# Patient Record
Sex: FEMALE | Race: WHITE | URBAN - METROPOLITAN AREA
[De-identification: names, ages, dates, MRNs, and addresses within clinical notes are randomized per-mention and may not be internally consistent; named-entity substitution may affect disease eponyms.]

---

## 2018-06-08 ENCOUNTER — OFFICE VISIT (OUTPATIENT)
Dept: GERIATRIC MEDICINE | Age: 28
End: 2018-06-08

## 2018-06-08 VITALS
OXYGEN SATURATION: 99 % | SYSTOLIC BLOOD PRESSURE: 114 MMHG | TEMPERATURE: 97.7 F | BODY MASS INDEX: 21.85 KG/M2 | WEIGHT: 128 LBS | DIASTOLIC BLOOD PRESSURE: 66 MMHG | HEART RATE: 72 BPM | RESPIRATION RATE: 16 BRPM | HEIGHT: 64 IN

## 2018-06-08 DIAGNOSIS — B96.89 ACUTE BACTERIAL RHINOSINUSITIS: ICD-10-CM

## 2018-06-08 DIAGNOSIS — R68.89 FULLNESS IN HEAD: Primary | ICD-10-CM

## 2018-06-08 DIAGNOSIS — H66.011 ACUTE SUPPURATIVE OTITIS MEDIA OF RIGHT EAR WITH SPONTANEOUS RUPTURE OF TYMPANIC MEMBRANE, RECURRENCE NOT SPECIFIED: ICD-10-CM

## 2018-06-08 DIAGNOSIS — J01.90 ACUTE BACTERIAL RHINOSINUSITIS: ICD-10-CM

## 2018-06-08 DIAGNOSIS — L20.84 INTRINSIC ECZEMA: ICD-10-CM

## 2018-06-08 PROBLEM — L40.54: Chronic | Status: ACTIVE | Noted: 2018-06-08

## 2018-06-08 RX ORDER — FLUTICASONE PROPIONATE 50 MCG
2 SPRAY, SUSPENSION (ML) NASAL DAILY
Qty: 1 BOTTLE | Refills: 3 | Status: SHIPPED | OUTPATIENT
Start: 2018-06-08 | End: 2018-06-13

## 2018-06-08 RX ORDER — AMOXICILLIN AND CLAVULANATE POTASSIUM 875; 125 MG/1; MG/1
1 TABLET, FILM COATED ORAL 2 TIMES DAILY
Qty: 14 TAB | Refills: 0 | Status: SHIPPED | OUTPATIENT
Start: 2018-06-08 | End: 2018-06-15

## 2018-06-08 RX ORDER — FLUCONAZOLE 150 MG/1
150 TABLET ORAL ONCE
Qty: 1 TAB | Refills: 0 | Status: SHIPPED | OUTPATIENT
Start: 2018-06-08 | End: 2018-06-08

## 2018-06-08 NOTE — PROGRESS NOTES
ADVISED PATIENT OF THE FOLLOWING HEALTH MAINTAINCE DUE  Health Maintenance Due   Topic Date Due    HPV Age 9Y-34Y (1 of 1 - Female 3 Dose Series) 01/28/2003    DTaP/Tdap/Td series (1 - Tdap) 01/28/2013    PAP AKA CERVICAL CYTOLOGY  01/28/2013      Chief Complaint   Patient presents with    Cold Symptoms     Patient being seen for cold s/s, c/o popping in her ears. 1. Have you been to the ER, urgent care clinic since your last visit? Hospitalized since your last visit? Yes Patient sees outsided provider    2. Have you seen or consulted any other health care providers outside of the [address] [place] [address] since your last visit? Include any DEXA scan, mammography  or colon screening. No    3. Do you have an Advance Directive on file? no    4. Do you have a DNR on file? Full        Patient is accompanied by self I have received verbal consent from [place] to discuss any/all medical information while they are present in the room.

## 2018-06-08 NOTE — LETTER
2018 2:58 PM 
 
Ms. Reina Jane [address] [facility] [address] [facility] [address] [facility] [address] Discharge Instructions/Plan of Care 18 - Please remember to not change the treatment plan without letting us know as the orders are specific to your care needs. - If you do not receive your medications it is your responsibility to let us know or contact your pharmacy. Any questions do not hesitate to call Ar Ru[facility] Nurse at [phone]. Discontinued Medication/Treatment: The following items have been discontinued today, please stop all use of these items below: There are no discontinued medications. Treatment Plan: 1. Fullness in head 2. Acute bacterial rhinosinusitis 3. Acute suppurative otitis media of right ear with spontaneous rupture of tympanic membrane, recurrence not specified 4. Intrinsic eczema Orders Placed This Encounter  amoxicillin-clavulanate (AUGMENTIN) 875-125 mg per tablet Sig: Take 1 Tab by mouth two (2) times a day for 7 days. Indications: ACUTE BACTERIAL SINUSITIS Dispense:  14 Tab Refill:  0  
 fluconazole (DIFLUCAN) 150 mg tablet Sig: Take 1 Tab by mouth once for 1 dose. Indications: PREVENTION OF VULVOVAGINAL CANDIDIASIS Dispense:  1 Tab Refill:  0  
 fluticasone (FLONASE) 50 mcg/actuation nasal spray (sent over but you can get them over the counter) Si Sprays by Both Nostrils route daily for 5 days. Indications: Allergic Rhinitis Dispense:  1 Bottle Refill:  3 Return Visit/Follow Up:   
 
Return as needed. We appreciate you allowing us to participate in your health care.  
 
Ying Gutierrez NP

## 2018-06-11 RX ORDER — PILOCARPINE HYDROCHLORIDE 5 MG/1
TABLET, FILM COATED ORAL
COMMUNITY
Start: 2018-06-01

## 2018-06-11 NOTE — PROGRESS NOTES
Reason for Visit:      Chief Complaint   Patient presents with    Cold Symptoms     Patient being seen for cold s/s, c/o popping in her ears. History of Present Illness:   Nicole Blank is a 34 y.o. female adult patient who presents today with concerns of a \"head cold\" x 1 week. She states she has had a snotty nose, runny eyes, cough, and congestion over a week. Ms. Kim Cohen states she is more of a holistic treatment patient and would rather to treat without medications ect. . She has been taking Zyrtec x 1 week without any help. During our visit it is noted that she is under treatment of a provider for psoriatic arthritis. Today she has noted a lot of \"popping\" in both ears as well as more fullness in her head. During her exam I found that the right ear TM has ruptured and is red and inflamed. The left is bulging without infection. She also has a sinus infection and notes that she removed a large piece of wax from her right ear last week. She is ok with taking the oral antibiotics but would like to not take the drops to treat the ear infection she will start with this oral treatment. Diagnosis/Treatment Plan: The following orders have been placed for treatment of the diagnoses above:    ICD-10-CM ICD-9-CM    1. Fullness in head R68.89 784.99 amoxicillin-clavulanate (AUGMENTIN) 875-125 mg per tablet      fluconazole (DIFLUCAN) 150 mg tablet      fluticasone (FLONASE) 50 mcg/actuation nasal spray   2. Acute bacterial rhinosinusitis J01.90 461.9 amoxicillin-clavulanate (AUGMENTIN) 875-125 mg per tablet    B96.89  fluconazole (DIFLUCAN) 150 mg tablet      fluticasone (FLONASE) 50 mcg/actuation nasal spray   3. Acute suppurative otitis media of right ear with spontaneous rupture of tympanic membrane, recurrence not specified H66.011 382.01 amoxicillin-clavulanate (AUGMENTIN) 875-125 mg per tablet      fluconazole (DIFLUCAN) 150 mg tablet      fluticasone (FLONASE) 50 mcg/actuation nasal spray   4.  Intrinsic eczema L20.84 691.8         The following medication/treatments have been discontinued by the provider today:   There are no discontinued medications. Follow Up: Follow-up Disposition:  Return if symptoms worsen or fail to improve. Subjective: Allergies   Allergen Reactions    Bactrim [Sulfamethoprim] Rash     Prior to Admission medications    Medication Sig Start Date End Date Taking? Authorizing Provider   pilocarpine (SALAGEN) 5 mg tablet  6/1/18   Historical Provider   golimumab (SIMPONI) 50 mg/0.5 mL syrg by SubCUTAneous route. Yes Historical Provider   amoxicillin-clavulanate (AUGMENTIN) 875-125 mg per tablet Take 1 Tab by mouth two (2) times a day for 7 days. Indications: ACUTE BACTERIAL SINUSITIS 6/8/18 6/15/18 Yes Gena Ibarra NP   fluticasone (FLONASE) 50 mcg/actuation nasal spray 2 Sprays by Both Nostrils route daily for 5 days. Indications: Allergic Rhinitis 6/8/18 6/13/18 Yes Gena Ibarra NP     Past Medical History:   Diagnosis Date    Arthritis     [facility]       History reviewed. No pertinent surgical history. Social History   Substance Use Topics    Smoking status: Never Smoker    Smokeless tobacco: Never Used    Alcohol use No      Family History   Problem Relation Age of Onset    No Known Problems Mother     No Known Problems Father       Latest Laboratory Results:   No current laboratory testing available for notation. Objective:   CODE STATUS: Full  Vitals:    06/08/18 1415   BP: 114/66   Pulse: 72   Resp: 16   Temp: 97.7 °F (36.5 °C)   TempSrc: Oral   SpO2: 99%   Weight: 128 lb (58.1 kg)   Height: 5' 4\" (1.626 m)     Wt Readings from Last 3 Encounters:   06/08/18 128 lb (58.1 kg)     BP Readings from Last 3 Encounters:   06/08/18 114/66     Review of Systems   Constitutional: Negative for activity change, appetite change, fatigue and fever.    HENT: Positive for congestion, ear pain, hearing loss, postnasal drip, rhinorrhea, sinus pain, sinus pressure, sneezing, sore throat and trouble swallowing. Negative for dental problem. Eyes: Positive for discharge and redness. Negative for visual disturbance. Respiratory: Negative for apnea, cough, chest tightness, shortness of breath and wheezing. Cardiovascular: Negative for chest pain, palpitations and leg swelling. Gastrointestinal: Negative for abdominal distention, abdominal pain, blood in stool, constipation, diarrhea, nausea and vomiting. Endocrine: Negative for polydipsia, polyphagia and polyuria. Genitourinary: Negative for flank pain, frequency and urgency. Musculoskeletal: Negative for arthralgias, gait problem, joint swelling and neck pain. Skin: Negative for color change, pallor, rash and wound. Allergic/Immunologic: Negative for environmental allergies and food allergies. Neurological: Negative for dizziness, tremors, weakness, light-headedness, numbness and headaches. Hematological: Negative for adenopathy. Psychiatric/Behavioral: Negative for agitation, confusion and sleep disturbance. The patient is not nervous/anxious. Physical Exam   Constitutional: She is oriented to person, place, and time and well-developed, well-nourished, and in no distress. Vital signs are normal. She appears to not be writhing in pain, not malnourished and not dehydrated. She appears healthy. She does not have a sickly appearance. No distress. HENT:   Head: Normocephalic and atraumatic. Right Ear: Hearing and ear canal normal. There is drainage and swelling. Tympanic membrane is perforated. A middle ear effusion is present. Left Ear: Hearing, external ear and ear canal normal. Tympanic membrane is bulging. A middle ear effusion is present. Nose: Mucosal edema and rhinorrhea present. Right sinus exhibits frontal sinus tenderness. Left sinus exhibits frontal sinus tenderness.    Mouth/Throat: Uvula is midline and mucous membranes are normal. Mucous membranes are not pale, not dry and not cyanotic. No oral lesions. No uvula swelling. Posterior oropharyngeal edema and posterior oropharyngeal erythema present. Neck: Trachea normal and normal range of motion. Neck supple. No JVD present. No thyromegaly present. Cardiovascular: Normal rate, regular rhythm, S1 normal, S2 normal, normal heart sounds, intact distal pulses and normal pulses. Exam reveals no gallop and no friction rub. No murmur heard. No extremity edema is present during today's exam.    Pulmonary/Chest: Effort normal and breath sounds normal.   Abdominal: Soft. Normal appearance and bowel sounds are normal. There is no hepatosplenomegaly. There is no tenderness. There is no CVA tenderness. Neurological: She is alert and oriented to person, place, and time. She has normal sensation, normal reflexes and intact cranial nerves. She displays weakness. She exhibits normal muscle tone. Gait normal. Coordination and gait normal. GCS score is 15. Skin: Skin is warm, dry and intact. No bruising and no rash noted. She is not diaphoretic. No cyanosis. No pallor. Nails show no clubbing. Psychiatric: Mood, memory, affect and judgment normal.   Baseline mood and affect unchanged from normal.         Disclaimer:   [id number] to call back or return to office if symptoms worsen/change/persist. Discussed expected course/resolution/complications of diagnosis in detail with patient. Medication risks/benefits/costs/interactions/alternatives discussed with patient and she was given an after visit summary which includes diagnoses, current medications, & vitals. Steve Anup expressed understanding with the diagnosis and plan.

## 2018-06-11 NOTE — PATIENT INSTRUCTIONS
Perforated Eardrum: Care Instructions  Your Care Instructions    A tear or hole in the membrane of the middle ear is called a perforated or ruptured eardrum. This can happen if an infection builds up inside the ear or if the eardrum gets injured. You may find it hard to hear out of that ear or may hear a buzzing sound. You may have an earache or have fluids that drain from the ear. Your eardrum should heal on its own in a few weeks, and you should hear normally then. If you have an infection, your doctor may prescribe antibiotics. You may need pain relief medicine for your earache. Your doctor will check to see if your eardrum has healed. If not, you may need surgery to repair the eardrum. Follow-up care is a key part of your treatment and safety. Be sure to make and go to all appointments, and call your doctor if you are having problems. It's also a good idea to know your test results and keep a list of the medicines you take. How can you care for yourself at home? · If your doctor prescribed antibiotics, take them as directed. Do not stop taking them just because you feel better. You need to take the full course of antibiotics. · Take an over-the-counter pain medicine, such as acetaminophen (Tylenol), ibuprofen (Advil, Motrin), or naproxen (Aleve), as needed. Read and follow all instructions on the label. · Do not take two or more pain medicines at the same time unless the doctor told you to. Many pain medicines have acetaminophen, which is Tylenol. Too much acetaminophen (Tylenol) can be harmful. · To ease pain, put a warm washcloth or a heating pad set on low on your ear. You may have some drainage from the ear. · Be careful when taking over-the-counter cold or flu medicines and Tylenol at the same time. Many of these medicines have acetaminophen, which is Tylenol. Read the labels to make sure that you are not taking more than the recommended dose. Too much Tylenol can be harmful.   · Keep your ears dry.  ¨ Take baths until your doctor says you can take showers again. ¨ When you wash your hair, use cotton lightly coated with petroleum jelly as an earplug. Do not use plastic earplugs. ¨ Do not swim until your doctor says you can. ¨ If you get water in your ears, turn your head to each side and pull the earlobe in different directions. This will help the water run out. If your ears are still wet, use a hair dryer set on the lowest heat. Hold the dryer several inches from your ear. · Do not put anything into your ear canal. For example, do not use a cotton swab to clean the inside of your ear. It can damage your ear. If you think you have something inside your ear, ask your doctor to check it. When should you call for help? Call your doctor now or seek immediate medical care if:  ? · You have signs of infection, such as:  ¨ Increased pain, swelling, warmth, or redness. ¨ Pus draining from the ear. ¨ A fever. ? Watch closely for changes in your health, and be sure to contact your doctor if:  ? · You have changes in hearing. ? · You do not get better as expected. Where can you learn more? Go to http://grace-henny.info/. Enter N445 in the search box to learn more about \"Perforated Eardrum: Care Instructions. \"  Current as of: May 12, 2017  Content Version: 11.4  © 9160-1388 [employer]. Care instructions adapted under license by [employer] (which disclaims liability or warranty for this information). If you have questions about a medical condition or this instruction, always ask your healthcare professional. [address] any warranty or liability for your use of this information.